# Patient Record
Sex: MALE | Race: WHITE
[De-identification: names, ages, dates, MRNs, and addresses within clinical notes are randomized per-mention and may not be internally consistent; named-entity substitution may affect disease eponyms.]

---

## 2018-08-25 ENCOUNTER — HOSPITAL ENCOUNTER (EMERGENCY)
Dept: HOSPITAL 41 - JD.ED | Age: 33
Discharge: HOME | End: 2018-08-25
Payer: COMMERCIAL

## 2018-08-25 DIAGNOSIS — J40: Primary | ICD-10-CM

## 2018-08-25 DIAGNOSIS — R07.89: ICD-10-CM

## 2018-08-25 NOTE — EDM.PDOC
ED HPI GENERAL MEDICAL PROBLEM





- General


Chief Complaint: Respiratory Problem


Stated Complaint: CHEST PAIN


Time Seen by Provider: 08/25/18 11:18


Source of Information: Reports: Patient, RN Notes Reviewed





- History of Present Illness


INITIAL COMMENTS - FREE TEXT/NARRATIVE: 





32 year old male with cough for about the last 3 weeks, mostly nonprod, now 

getting better the last few days but still occasional cough yesterday and 

today.  His other concern is occasional L chest discomfort for the past several 

months.  At times will feel a slight shooting discomfort L chest that is usual 

present for just a few seconds.  No severe ache or heaviness at any time.  Has 

not been short of breath.  Pain does not radiate to neck, shoulder, arm or 

back.  No hx of Htn, diabetes or other known medical problems.  Works as a 

 so does do a fair amt of heavy lifting. 





- Related Data


 Allergies











Allergy/AdvReac Type Severity Reaction Status Date / Time


 


No Known Allergies Allergy   Verified 08/25/18 11:21











Home Meds: 


 Home Meds





. [No Known Home Meds]  08/25/18 [History]











Past Medical History





- Past Health History


Medical/Surgical History: Denies Medical/Surgical History





Social & Family History





- Family History


Family Medical History: Noncontributory





- Tobacco Use


Smoking Status *Q: Never Smoker





- Caffeine Use


Caffeine Use: Reports: Coffee





- Recreational Drug Use


Recreational Drug Use: No





ED ROS GENERAL





- Review of Systems


Review Of Systems: See Below


Constitutional: Denies: Fever, Chills


HEENT: Denies: Rhinitis, Sinus Problem, Throat Pain


Respiratory: Denies: Shortness of Breath


Cardiovascular: Reports: Chest Pain.  Denies: Lightheadedness, Palpitations, 

Syncope


GI/Abdominal: Denies: Abdominal Pain, Nausea, Vomiting


Musculoskeletal: Denies: Neck Pain, Shoulder Pain, Arm Pain, Back Pain, Leg Pain


Skin: Reports: No Symptoms


Neurological: Denies: Dizziness, Numbness, Tingling





ED EXAM, GENERAL





- Physical Exam


Exam: See Below


General Appearance: Alert, No Apparent Distress


Eye Exam: Bilateral Eye: PERRL


Throat/Mouth: Normal Inspection


Head: Atraumatic


Neck: Supple


Respiratory/Chest: No Respiratory Distress, Lungs Clear, Normal Breath Sounds


Cardiovascular: Regular Rate, Rhythm


GI/Abdominal: Soft, Non-Tender


Back Exam: Normal Inspection


Extremities: Normal Inspection, Normal Range of Motion.  No: Pedal Edema, Leg 

Pain


Neurological: Alert, Oriented, No Motor/Sensory Deficits


Skin Exam: Warm, Dry, Normal Color





Course





- Vital Signs


Last Recorded V/S: 


 Last Vital Signs











Temp  99.3 F   08/25/18 11:12


 


Pulse  100   08/25/18 11:12


 


Resp  18   08/25/18 11:12


 


BP  157/78 H  08/25/18 11:12


 


Pulse Ox  100   08/25/18 11:12














Departure





- Departure


Time of Disposition: 11:53


Disposition: Home, Self-Care 01


Condition: Fair


Clinical Impression: 


 Bronchitis, Atypical chest pain








- Discharge Information


Instructions:  Acute Bronchitis, Adult, Easy-to-Read, Nonspecific Chest Pain, 

Easy-to-Read


Referrals: 


PCP,None [Primary Care Provider] - 


Forms:  ED Department Discharge


Additional Instructions: 


Your lungs are clear, no evidence for pneumonia or bacterial infection at this 

time.  Vaporizer or steam as needed,  claritin 10 mg daily as needed for 

seasonal allergies.  Return to ED as needed if symptoms worsening in any way.